# Patient Record
Sex: MALE | Race: WHITE | NOT HISPANIC OR LATINO | URBAN - METROPOLITAN AREA
[De-identification: names, ages, dates, MRNs, and addresses within clinical notes are randomized per-mention and may not be internally consistent; named-entity substitution may affect disease eponyms.]

---

## 2020-06-19 ENCOUNTER — LAB OUTSIDE AN ENCOUNTER (OUTPATIENT)
Dept: URBAN - METROPOLITAN AREA CLINIC 96 | Facility: CLINIC | Age: 30
End: 2020-06-19

## 2020-06-19 ENCOUNTER — OFFICE VISIT (OUTPATIENT)
Dept: URBAN - METROPOLITAN AREA CLINIC 96 | Facility: CLINIC | Age: 30
End: 2020-06-19
Payer: MEDICAID

## 2020-06-19 DIAGNOSIS — R10.13 EPIGASTRIC PAIN: ICD-10-CM

## 2020-06-19 DIAGNOSIS — R63.4 WEIGHT LOSS: ICD-10-CM

## 2020-06-19 DIAGNOSIS — Z87.19 HISTORY OF ACUTE PANCREATITIS: ICD-10-CM

## 2020-06-19 PROCEDURE — G9903 PT SCRN TBCO ID AS NON USER: HCPCS | Performed by: INTERNAL MEDICINE

## 2020-06-19 PROCEDURE — 99204 OFFICE O/P NEW MOD 45 MIN: CPT | Performed by: INTERNAL MEDICINE

## 2020-06-19 PROCEDURE — G8420 CALC BMI NORM PARAMETERS: HCPCS | Performed by: INTERNAL MEDICINE

## 2020-06-19 PROCEDURE — G8427 DOCREV CUR MEDS BY ELIG CLIN: HCPCS | Performed by: INTERNAL MEDICINE

## 2020-06-19 RX ORDER — OMEPRAZOLE 20 MG/1
1 CAPSULE 30 MINUTES BEFORE MORNING MEAL CAPSULE, DELAYED RELEASE ORAL ONCE A DAY
Status: ACTIVE | COMMUNITY

## 2020-06-19 NOTE — HPI-OTHER HISTORIES
is a  at [employer], currently unemployeed hx of pancratitis x 2 epidoes, last in 2015 from etoh now here w/ four months of epigastric pain. went to an urgent care in [place] and was told its an ulcer ang given clarithro and flagyl. took it and got a rash and knee swelling so stopped  went to ER 5-6 x in last few months for severe pain has a lot of anxiety used to smoke, no longer has abused adderall also uses phenoHCL and a SE asian herb for anxiety no melena, but dark stools no hematocehzia lost weight cant eat very tremulous and anxious today

## 2020-06-23 ENCOUNTER — TELEPHONE ENCOUNTER (OUTPATIENT)
Dept: URBAN - METROPOLITAN AREA CLINIC 92 | Facility: CLINIC | Age: 30
End: 2020-06-23

## 2020-06-23 ENCOUNTER — OFFICE VISIT (OUTPATIENT)
Dept: URBAN - METROPOLITAN AREA SURGERY CENTER 18 | Facility: SURGERY CENTER | Age: 30
End: 2020-06-23

## 2020-07-22 ENCOUNTER — OFFICE VISIT (OUTPATIENT)
Dept: URBAN - METROPOLITAN AREA SURGERY CENTER 18 | Facility: SURGERY CENTER | Age: 30
End: 2020-07-22

## 2020-08-10 ENCOUNTER — OFFICE VISIT (OUTPATIENT)
Dept: URBAN - METROPOLITAN AREA SURGERY CENTER 18 | Facility: SURGERY CENTER | Age: 30
End: 2020-08-10

## 2020-08-13 ENCOUNTER — DASHBOARD ENCOUNTERS (OUTPATIENT)
Age: 30
End: 2020-08-13

## 2020-08-13 ENCOUNTER — OFFICE VISIT (OUTPATIENT)
Dept: URBAN - METROPOLITAN AREA TELEHEALTH 2 | Facility: TELEHEALTH | Age: 30
End: 2020-08-13

## 2020-08-13 RX ORDER — OMEPRAZOLE 20 MG/1
1 CAPSULE 30 MINUTES BEFORE MORNING MEAL CAPSULE, DELAYED RELEASE ORAL ONCE A DAY
Status: ACTIVE | COMMUNITY

## 2020-08-13 NOTE — HPI-OTHER HISTORIES
is a  at [employer], currently unemployeed hx of pancratitis x 2 epidoes, last in 2015 from etoh now here w/ four months of epigastric pain. went to an urgent care in [place] and was told its an ulcer ang given clarithro and flagyl. took it and got a rash and knee swelling so stopped  went to ER 5-6 x in last few months for severe pain has a lot of anxiety used to smoke, no longer has abused adderall also uses phenoHCL and a SE asian herb for anxiety no melena, but dark stools no hematocehzia lost weight cant eat very tremulous and anxious today